# Patient Record
Sex: FEMALE | Race: WHITE | Employment: FULL TIME | URBAN - METROPOLITAN AREA
[De-identification: names, ages, dates, MRNs, and addresses within clinical notes are randomized per-mention and may not be internally consistent; named-entity substitution may affect disease eponyms.]

---

## 2018-11-30 ENCOUNTER — APPOINTMENT (OUTPATIENT)
Dept: CT IMAGING | Age: 48
End: 2018-11-30
Payer: COMMERCIAL

## 2018-11-30 ENCOUNTER — HOSPITAL ENCOUNTER (EMERGENCY)
Age: 48
Discharge: HOME | End: 2018-11-30
Attending: EMERGENCY MEDICINE
Payer: COMMERCIAL

## 2018-11-30 VITALS
BODY MASS INDEX: 31.89 KG/M2 | OXYGEN SATURATION: 96 % | SYSTOLIC BLOOD PRESSURE: 110 MMHG | HEART RATE: 89 BPM | TEMPERATURE: 99.7 F | DIASTOLIC BLOOD PRESSURE: 68 MMHG | RESPIRATION RATE: 16 BRPM | WEIGHT: 173.28 LBS | HEIGHT: 62 IN

## 2018-11-30 DIAGNOSIS — R10.9 ABDOMINAL PAIN, UNSPECIFIED ABDOMINAL LOCATION: Primary | ICD-10-CM

## 2018-11-30 DIAGNOSIS — N30.00 ACUTE CYSTITIS WITHOUT HEMATURIA: ICD-10-CM

## 2018-11-30 LAB
A/G RATIO: 1.1 (ref 1.1–2.2)
ALBUMIN SERPL-MCNC: 4 G/DL (ref 3.4–5)
ALP BLD-CCNC: 81 U/L (ref 40–129)
ALT SERPL-CCNC: 32 U/L (ref 10–40)
ANION GAP SERPL CALCULATED.3IONS-SCNC: 15 MMOL/L (ref 3–16)
AST SERPL-CCNC: 24 U/L (ref 15–37)
BACTERIA: ABNORMAL /HPF
BILIRUB SERPL-MCNC: 0.6 MG/DL (ref 0–1)
BILIRUBIN URINE: NEGATIVE
BLOOD, URINE: ABNORMAL
BUN BLDV-MCNC: 11 MG/DL (ref 7–20)
CALCIUM SERPL-MCNC: 8.9 MG/DL (ref 8.3–10.6)
CHLORIDE BLD-SCNC: 96 MMOL/L (ref 99–110)
CLARITY: ABNORMAL
CO2: 23 MMOL/L (ref 21–32)
COLOR: YELLOW
CREAT SERPL-MCNC: 1 MG/DL (ref 0.6–1.1)
EPITHELIAL CELLS, UA: 14 /HPF (ref 0–5)
GFR AFRICAN AMERICAN: >60
GFR NON-AFRICAN AMERICAN: 59
GLOBULIN: 3.7 G/DL
GLUCOSE BLD-MCNC: 115 MG/DL (ref 70–99)
GLUCOSE URINE: NEGATIVE MG/DL
HCG(URINE) PREGNANCY TEST: NEGATIVE
HCT VFR BLD CALC: 39.6 % (ref 36–48)
HEMOGLOBIN: 13.6 G/DL (ref 12–16)
KETONES, URINE: NEGATIVE MG/DL
LACTIC ACID: 1.1 MMOL/L (ref 0.4–2)
LEUKOCYTE ESTERASE, URINE: NEGATIVE
MCH RBC QN AUTO: 30.9 PG (ref 26–34)
MCHC RBC AUTO-ENTMCNC: 34.5 G/DL (ref 31–36)
MCV RBC AUTO: 89.6 FL (ref 80–100)
MICROSCOPIC EXAMINATION: YES
NITRITE, URINE: NEGATIVE
PDW BLD-RTO: 13.2 % (ref 12.4–15.4)
PH UA: 6
PLATELET # BLD: 240 K/UL (ref 135–450)
PMV BLD AUTO: 8.2 FL (ref 5–10.5)
POTASSIUM SERPL-SCNC: 3.2 MMOL/L (ref 3.5–5.1)
PROTEIN UA: NEGATIVE MG/DL
RBC # BLD: 4.42 M/UL (ref 4–5.2)
RBC UA: 13 /HPF (ref 0–4)
SODIUM BLD-SCNC: 134 MMOL/L (ref 136–145)
SPECIFIC GRAVITY UA: 1.02
TOTAL PROTEIN: 7.7 G/DL (ref 6.4–8.2)
URINE REFLEX TO CULTURE: YES
URINE TYPE: ABNORMAL
UROBILINOGEN, URINE: 0.2 E.U./DL
WBC # BLD: 10.2 K/UL (ref 4–11)
WBC UA: 16 /HPF (ref 0–5)

## 2018-11-30 PROCEDURE — 6370000000 HC RX 637 (ALT 250 FOR IP): Performed by: PHYSICIAN ASSISTANT

## 2018-11-30 PROCEDURE — 87040 BLOOD CULTURE FOR BACTERIA: CPT

## 2018-11-30 PROCEDURE — 83605 ASSAY OF LACTIC ACID: CPT

## 2018-11-30 PROCEDURE — 81001 URINALYSIS AUTO W/SCOPE: CPT

## 2018-11-30 PROCEDURE — 96361 HYDRATE IV INFUSION ADD-ON: CPT

## 2018-11-30 PROCEDURE — 99284 EMERGENCY DEPT VISIT MOD MDM: CPT

## 2018-11-30 PROCEDURE — 74176 CT ABD & PELVIS W/O CONTRAST: CPT

## 2018-11-30 PROCEDURE — 84703 CHORIONIC GONADOTROPIN ASSAY: CPT

## 2018-11-30 PROCEDURE — 96360 HYDRATION IV INFUSION INIT: CPT

## 2018-11-30 PROCEDURE — 6370000000 HC RX 637 (ALT 250 FOR IP): Performed by: EMERGENCY MEDICINE

## 2018-11-30 PROCEDURE — 85027 COMPLETE CBC AUTOMATED: CPT

## 2018-11-30 PROCEDURE — 87086 URINE CULTURE/COLONY COUNT: CPT

## 2018-11-30 PROCEDURE — 80053 COMPREHEN METABOLIC PANEL: CPT

## 2018-11-30 PROCEDURE — 2580000003 HC RX 258: Performed by: EMERGENCY MEDICINE

## 2018-11-30 RX ORDER — ONDANSETRON 4 MG/1
4 TABLET, ORALLY DISINTEGRATING ORAL EVERY 8 HOURS PRN
Qty: 20 TABLET | Refills: 0 | Status: SHIPPED | OUTPATIENT
Start: 2018-11-30

## 2018-11-30 RX ORDER — ACETAMINOPHEN 325 MG/1
650 TABLET ORAL ONCE
Status: COMPLETED | OUTPATIENT
Start: 2018-11-30 | End: 2018-11-30

## 2018-11-30 RX ORDER — DICYCLOMINE HYDROCHLORIDE 10 MG/1
10 CAPSULE ORAL EVERY 6 HOURS PRN
Qty: 20 CAPSULE | Refills: 0 | Status: SHIPPED | OUTPATIENT
Start: 2018-11-30 | End: 2018-11-30

## 2018-11-30 RX ORDER — ONDANSETRON 4 MG/1
4 TABLET, ORALLY DISINTEGRATING ORAL EVERY 8 HOURS PRN
Qty: 20 TABLET | Refills: 0 | Status: SHIPPED | OUTPATIENT
Start: 2018-11-30 | End: 2018-11-30

## 2018-11-30 RX ORDER — DICYCLOMINE HYDROCHLORIDE 10 MG/1
10 CAPSULE ORAL EVERY 6 HOURS PRN
Qty: 20 CAPSULE | Refills: 0 | Status: SHIPPED | OUTPATIENT
Start: 2018-11-30

## 2018-11-30 RX ORDER — 0.9 % SODIUM CHLORIDE 0.9 %
1000 INTRAVENOUS SOLUTION INTRAVENOUS ONCE
Status: COMPLETED | OUTPATIENT
Start: 2018-11-30 | End: 2018-11-30

## 2018-11-30 RX ORDER — POTASSIUM CHLORIDE 750 MG/1
20 TABLET, FILM COATED, EXTENDED RELEASE ORAL ONCE
Status: COMPLETED | OUTPATIENT
Start: 2018-11-30 | End: 2018-11-30

## 2018-11-30 RX ORDER — IBUPROFEN 600 MG/1
600 TABLET ORAL EVERY 6 HOURS PRN
Qty: 30 TABLET | Refills: 0 | Status: SHIPPED | OUTPATIENT
Start: 2018-11-30

## 2018-11-30 RX ORDER — MORPHINE SULFATE 4 MG/ML
4 INJECTION, SOLUTION INTRAMUSCULAR; INTRAVENOUS ONCE
Status: DISCONTINUED | OUTPATIENT
Start: 2018-11-30 | End: 2018-11-30

## 2018-11-30 RX ADMIN — ACETAMINOPHEN 650 MG: 325 TABLET, FILM COATED ORAL at 15:52

## 2018-11-30 RX ADMIN — SODIUM CHLORIDE 1000 ML: 9 INJECTION, SOLUTION INTRAVENOUS at 15:53

## 2018-11-30 RX ADMIN — POTASSIUM CHLORIDE 20 MEQ: 750 TABLET, EXTENDED RELEASE ORAL at 17:36

## 2018-11-30 ASSESSMENT — PAIN SCALES - GENERAL
PAINLEVEL_OUTOF10: 6
PAINLEVEL_OUTOF10: 4
PAINLEVEL_OUTOF10: 6

## 2018-11-30 ASSESSMENT — ENCOUNTER SYMPTOMS
SHORTNESS OF BREATH: 0
COLOR CHANGE: 0
CHEST TIGHTNESS: 0
DIARRHEA: 0
COUGH: 0
VOMITING: 0
ABDOMINAL PAIN: 0
NAUSEA: 1

## 2018-11-30 ASSESSMENT — PAIN DESCRIPTION - PAIN TYPE: TYPE: ACUTE PAIN

## 2018-11-30 ASSESSMENT — PAIN DESCRIPTION - DESCRIPTORS: DESCRIPTORS: RADIATING;STABBING

## 2018-11-30 ASSESSMENT — PAIN DESCRIPTION - LOCATION: LOCATION: FLANK;ABDOMEN

## 2018-11-30 ASSESSMENT — PAIN DESCRIPTION - ORIENTATION: ORIENTATION: RIGHT;LEFT;LOWER;MID

## 2018-11-30 NOTE — ED PROVIDER NOTES
an unremarkable noncontrast appearance. No urolithiasis or hydronephrosis GI/Bowel: No gastrointestinal abnormality demonstrated. Appendix normal Pelvis: Uterus surgically absent. Ovaries within normal limits with a dominant follicle on the left ovary. Urinary bladder unremarkable. No pelvic fluid Peritoneum/Retroperitoneum: No ascites or pneumoperitoneum. Aorta normal in caliber. No retroperitoneal adenopathy Bones/Soft Tissues: Small fat containing supraumbilical midline incisional hernia. No bony abnormality     1. No acute process 2. Hepatic steatosis         PROCEDURES   Unless otherwise noted below, none     Procedures    CRITICAL CARE TIME   N/A    CONSULTS:  None      EMERGENCY DEPARTMENT COURSE and DIFFERENTIALDIAGNOSIS/MDM:   Vitals:    Vitals:    11/30/18 1732 11/30/18 1747 11/30/18 1801 11/30/18 1902   BP: 106/69 105/70 108/69 110/68   Pulse:    89   Resp:    16   Temp:       TempSrc:       SpO2: 97% 97% 98% 96%   Weight:       Height:           Patient was given thefollowing medications:  Medications   0.9 % sodium chloride bolus (0 mLs Intravenous Stopped 11/30/18 1817)   acetaminophen (TYLENOL) tablet 650 mg (650 mg Oral Given 11/30/18 1552)   potassium chloride (KLOR-CON) extended release tablet 20 mEq (20 mEq Oral Given 11/30/18 1736)       Patient presents to ED with HPI noted above. Upon arrival she is tachycardic with a pulse of 111 but normotensive. Temperature 99.7 in the ED, given Tylenol. She is nontoxic in appearance. Oxygen saturation 96% on room air. Oxygen saturation did drop at one point to 93% on room air, she denies any chest pain or shortness of breath. Physical exam as above. Abdomen soft and nontender throughout. No tenderness to back. Septic workup initiated. Blood cultures pending. CBC showed no leukocytosis or anemia. CMP showed GFR 59, similar to previous. Creatinine 1.0. Patient does have hypokalemia, given Klor-Con in the ED.   Lactic acid within normal

## 2018-12-02 LAB — URINE CULTURE, ROUTINE: NORMAL

## 2018-12-05 LAB — CULTURE, BLOOD 2: NORMAL

## 2019-06-03 ENCOUNTER — HOSPITAL ENCOUNTER (EMERGENCY)
Age: 49
Discharge: HOME | End: 2019-06-03
Payer: COMMERCIAL

## 2019-06-03 ENCOUNTER — APPOINTMENT (OUTPATIENT)
Dept: CT IMAGING | Age: 49
End: 2019-06-03
Payer: COMMERCIAL

## 2019-06-03 VITALS
SYSTOLIC BLOOD PRESSURE: 107 MMHG | OXYGEN SATURATION: 99 % | BODY MASS INDEX: 31.69 KG/M2 | WEIGHT: 173.28 LBS | DIASTOLIC BLOOD PRESSURE: 71 MMHG | HEART RATE: 60 BPM | RESPIRATION RATE: 16 BRPM | TEMPERATURE: 98.8 F

## 2019-06-03 DIAGNOSIS — R51.9 ACUTE NONINTRACTABLE HEADACHE, UNSPECIFIED HEADACHE TYPE: Primary | ICD-10-CM

## 2019-06-03 LAB
A/G RATIO: 1.2 (ref 1.1–2.2)
ALBUMIN SERPL-MCNC: 4.6 G/DL (ref 3.4–5)
ALP BLD-CCNC: 104 U/L (ref 40–129)
ALT SERPL-CCNC: 20 U/L (ref 10–40)
ANION GAP SERPL CALCULATED.3IONS-SCNC: 14 MMOL/L (ref 3–16)
AST SERPL-CCNC: 17 U/L (ref 15–37)
BASOPHILS ABSOLUTE: 0 K/UL (ref 0–0.2)
BASOPHILS RELATIVE PERCENT: 0.4 %
BILIRUB SERPL-MCNC: 0.3 MG/DL (ref 0–1)
BUN BLDV-MCNC: 14 MG/DL (ref 7–20)
CALCIUM SERPL-MCNC: 9.7 MG/DL (ref 8.3–10.6)
CHLORIDE BLD-SCNC: 97 MMOL/L (ref 99–110)
CO2: 28 MMOL/L (ref 21–32)
CREAT SERPL-MCNC: 0.9 MG/DL (ref 0.6–1.1)
EOSINOPHILS ABSOLUTE: 0.1 K/UL (ref 0–0.6)
EOSINOPHILS RELATIVE PERCENT: 1.3 %
GFR AFRICAN AMERICAN: >60
GFR NON-AFRICAN AMERICAN: >60
GLOBULIN: 3.9 G/DL
GLUCOSE BLD-MCNC: 107 MG/DL (ref 70–99)
HCG QUALITATIVE: NEGATIVE
HCT VFR BLD CALC: 43.1 % (ref 36–48)
HEMOGLOBIN: 15 G/DL (ref 12–16)
LYMPHOCYTES ABSOLUTE: 1.9 K/UL (ref 1–5.1)
LYMPHOCYTES RELATIVE PERCENT: 19 %
MCH RBC QN AUTO: 31.1 PG (ref 26–34)
MCHC RBC AUTO-ENTMCNC: 34.7 G/DL (ref 31–36)
MCV RBC AUTO: 89.5 FL (ref 80–100)
MONOCYTES ABSOLUTE: 0.7 K/UL (ref 0–1.3)
MONOCYTES RELATIVE PERCENT: 6.8 %
NEUTROPHILS ABSOLUTE: 7.4 K/UL (ref 1.7–7.7)
NEUTROPHILS RELATIVE PERCENT: 72.5 %
PDW BLD-RTO: 13.5 % (ref 12.4–15.4)
PLATELET # BLD: 349 K/UL (ref 135–450)
PMV BLD AUTO: 7.8 FL (ref 5–10.5)
POTASSIUM SERPL-SCNC: 3.4 MMOL/L (ref 3.5–5.1)
RBC # BLD: 4.81 M/UL (ref 4–5.2)
SODIUM BLD-SCNC: 139 MMOL/L (ref 136–145)
TOTAL PROTEIN: 8.5 G/DL (ref 6.4–8.2)
WBC # BLD: 10.2 K/UL (ref 4–11)

## 2019-06-03 PROCEDURE — 2580000003 HC RX 258: Performed by: NURSE PRACTITIONER

## 2019-06-03 PROCEDURE — 99284 EMERGENCY DEPT VISIT MOD MDM: CPT

## 2019-06-03 PROCEDURE — 80053 COMPREHEN METABOLIC PANEL: CPT

## 2019-06-03 PROCEDURE — 6360000002 HC RX W HCPCS: Performed by: NURSE PRACTITIONER

## 2019-06-03 PROCEDURE — 85025 COMPLETE CBC W/AUTO DIFF WBC: CPT

## 2019-06-03 PROCEDURE — 70450 CT HEAD/BRAIN W/O DYE: CPT

## 2019-06-03 PROCEDURE — 96375 TX/PRO/DX INJ NEW DRUG ADDON: CPT

## 2019-06-03 PROCEDURE — 84703 CHORIONIC GONADOTROPIN ASSAY: CPT

## 2019-06-03 PROCEDURE — 6370000000 HC RX 637 (ALT 250 FOR IP): Performed by: NURSE PRACTITIONER

## 2019-06-03 PROCEDURE — 96365 THER/PROPH/DIAG IV INF INIT: CPT

## 2019-06-03 RX ORDER — 0.9 % SODIUM CHLORIDE 0.9 %
1000 INTRAVENOUS SOLUTION INTRAVENOUS ONCE
Status: COMPLETED | OUTPATIENT
Start: 2019-06-03 | End: 2019-06-03

## 2019-06-03 RX ORDER — DIPHENHYDRAMINE HCL 25 MG
25 TABLET ORAL ONCE
Status: COMPLETED | OUTPATIENT
Start: 2019-06-03 | End: 2019-06-03

## 2019-06-03 RX ORDER — PREDNISONE 20 MG/1
60 TABLET ORAL DAILY
Qty: 15 TABLET | Refills: 0 | Status: SHIPPED | OUTPATIENT
Start: 2019-06-03 | End: 2019-06-08

## 2019-06-03 RX ORDER — DEXAMETHASONE SODIUM PHOSPHATE 4 MG/ML
10 INJECTION, SOLUTION INTRA-ARTICULAR; INTRALESIONAL; INTRAMUSCULAR; INTRAVENOUS; SOFT TISSUE ONCE
Status: COMPLETED | OUTPATIENT
Start: 2019-06-03 | End: 2019-06-03

## 2019-06-03 RX ORDER — METOCLOPRAMIDE 10 MG/1
10 TABLET ORAL ONCE
Status: COMPLETED | OUTPATIENT
Start: 2019-06-03 | End: 2019-06-03

## 2019-06-03 RX ORDER — IBUPROFEN 400 MG/1
800 TABLET ORAL ONCE
Status: COMPLETED | OUTPATIENT
Start: 2019-06-03 | End: 2019-06-03

## 2019-06-03 RX ORDER — KETOROLAC TROMETHAMINE 30 MG/ML
15 INJECTION, SOLUTION INTRAMUSCULAR; INTRAVENOUS ONCE
Status: COMPLETED | OUTPATIENT
Start: 2019-06-03 | End: 2019-06-03

## 2019-06-03 RX ORDER — MAGNESIUM SULFATE 1 G/100ML
1 INJECTION INTRAVENOUS ONCE
Status: COMPLETED | OUTPATIENT
Start: 2019-06-03 | End: 2019-06-03

## 2019-06-03 RX ORDER — BUTALBITAL, ACETAMINOPHEN AND CAFFEINE 300; 40; 50 MG/1; MG/1; MG/1
1 CAPSULE ORAL EVERY 4 HOURS PRN
Qty: 20 CAPSULE | Refills: 0 | Status: SHIPPED | OUTPATIENT
Start: 2019-06-03

## 2019-06-03 RX ADMIN — MAGNESIUM SULFATE HEPTAHYDRATE 1 G: 1 INJECTION, SOLUTION INTRAVENOUS at 22:15

## 2019-06-03 RX ADMIN — IBUPROFEN 800 MG: 400 TABLET, FILM COATED ORAL at 20:21

## 2019-06-03 RX ADMIN — METOCLOPRAMIDE HYDROCHLORIDE 10 MG: 10 TABLET ORAL at 20:21

## 2019-06-03 RX ADMIN — DIPHENHYDRAMINE HCL 25 MG: 25 TABLET ORAL at 20:21

## 2019-06-03 RX ADMIN — KETOROLAC TROMETHAMINE 15 MG: 30 INJECTION, SOLUTION INTRAMUSCULAR at 23:00

## 2019-06-03 RX ADMIN — SODIUM CHLORIDE 1000 ML: 9 INJECTION, SOLUTION INTRAVENOUS at 22:15

## 2019-06-03 RX ADMIN — DEXAMETHASONE SODIUM PHOSPHATE 10 MG: 4 INJECTION, SOLUTION INTRAMUSCULAR; INTRAVENOUS at 22:15

## 2019-06-03 ASSESSMENT — PAIN DESCRIPTION - PAIN TYPE
TYPE: ACUTE PAIN
TYPE: ACUTE PAIN

## 2019-06-03 ASSESSMENT — PAIN DESCRIPTION - LOCATION
LOCATION: HEAD
LOCATION: HEAD

## 2019-06-03 ASSESSMENT — PAIN DESCRIPTION - FREQUENCY
FREQUENCY: INTERMITTENT
FREQUENCY: CONTINUOUS

## 2019-06-03 ASSESSMENT — PAIN SCALES - GENERAL
PAINLEVEL_OUTOF10: 6
PAINLEVEL_OUTOF10: 2
PAINLEVEL_OUTOF10: 7
PAINLEVEL_OUTOF10: 5
PAINLEVEL_OUTOF10: 7
PAINLEVEL_OUTOF10: 0

## 2019-06-03 ASSESSMENT — PAIN SCALES - WONG BAKER
WONGBAKER_NUMERICALRESPONSE: 0
WONGBAKER_NUMERICALRESPONSE: 6
WONGBAKER_NUMERICALRESPONSE: 6

## 2019-06-03 ASSESSMENT — PAIN DESCRIPTION - PROGRESSION: CLINICAL_PROGRESSION: NOT CHANGED

## 2019-06-03 ASSESSMENT — PAIN DESCRIPTION - DESCRIPTORS
DESCRIPTORS: ACHING;CONSTANT
DESCRIPTORS: ACHING;THROBBING

## 2019-06-04 ASSESSMENT — ENCOUNTER SYMPTOMS
COLOR CHANGE: 0
SHORTNESS OF BREATH: 0
COUGH: 0
ABDOMINAL PAIN: 0
NAUSEA: 0
BACK PAIN: 0
VOMITING: 0

## 2019-06-04 NOTE — ED PROVIDER NOTES
**EVALUATED BY ADVANCED PRACTICE PROVIDER**        [address]      Pt Name: Tinnie Hammans  KBU:[id number]  [place] 1970  Date of evaluation: 6/3/2019  Provider: LASHELL Ayala CNP      Chief Complaint:    Chief Complaint   Patient presents with    Headache     intermittent x3 weeks. MRI brain 5/15/19    Dizziness       Nursing Notes, Past Medical Hx, Past Surgical Hx, Social Hx, Allergies, and Family Hx were all reviewed and agreed with or any disagreements were addressed in the HPI.    HPI:  (Location, Duration, Timing, Severity,Quality, Assoc Sx, Context, Modifying factors)  This is a  51 y.o. female who presents to the emergency department today with parents complaining of headache associated with intermittent lightheadedness. Patient states she has had this headache intermittently for the last 3-4 weeks. It is associated with pain on the right posterior neck. She denies any injury or trauma. She rates the pain 6/10 and describes it as an aching throbbing pain. No exacerbating or alleviating factors. She's been taking Motrin which is not helping. She states she has seen her PCP as well as orthopedic at [facility].   She states she had an MRI last month of her brain which was normal.    PastMedical/Surgical History:      Diagnosis Date    Anxiety     Hypertension     Insomnia     Miscarriage     x2         Procedure Laterality Date    ECTOPIC PREGNANCY SURGERY      HYSTERECTOMY      HYSTERECTOMY         Medications:  Discharge Medication List as of 6/3/2019 11:36 PM      CONTINUE these medications which have NOT CHANGED    Details   dicyclomine (BENTYL) 10 MG capsule Take 1 capsule by mouth every 6 hours as needed (cramps), Disp-20 capsule, R-0Print      ondansetron (ZOFRAN ODT) 4 MG disintegrating tablet Take 1 tablet by mouth every 8 hours as needed for Nausea, Disp-20 tablet, R-0Print      ibuprofen (ADVIL;MOTRIN) 600 MG tablet Take 1 tablet by mouth every 6 hours as needed for Pain, Disp-30 tablet, R-0Print      vilazodone HCl (VILAZODONE HCL) 10 MG TABS Take 10 mg by mouth dailyHistorical Med      amitriptyline (ELAVIL) 10 MG tablet Take 10 mg by mouth nightly as needed, R-5      atorvastatin (LIPITOR) 10 MG tablet Take 10 mg by mouth Daily, R-5      azithromycin (ZITHROMAX) 250 MG tablet Take 250 mg by mouth      busPIRone (BUSPAR) 7.5 MG tablet Take 1.5 tablets by mouth daily, R-0      fluticasone (FLONASE) 50 MCG/ACT nasal spray 1 spray by Nasal route Daily, R-0      hydrochlorothiazide (HYDRODIURIL) 25 MG tablet Take 25 mg by mouth daily, R-4      LORazepam (ATIVAN) 0.5 MG tablet Take 0.5 mg by mouth daily as needed               Review of Systems:  Review of Systems   Constitutional: Negative for chills, diaphoresis and fever. HENT: Negative. Eyes: Negative for visual disturbance. Respiratory: Negative for cough and shortness of breath. Cardiovascular: Negative for chest pain. Gastrointestinal: Negative for abdominal pain, nausea and vomiting. Musculoskeletal: Positive for neck pain. Negative for back pain. Skin: Negative for color change and rash. Allergic/Immunologic: Negative for immunocompromised state. Neurological: Positive for light-headedness and headaches. Negative for dizziness, syncope, weakness and numbness. Hematological: Negative for adenopathy. Psychiatric/Behavioral: Negative for confusion. All other systems reviewed and are negative. Positives and Pertinent negatives as per HPI. Except as noted above in the ROS, problem specific ROS was completed and is negative. Physical Exam:  Physical Exam   Constitutional: She is oriented to person, place, and time. Vital signs are normal. She appears well-developed and well-nourished. Non-toxic appearance. No distress. HENT:   Head: Normocephalic and atraumatic.    Right Ear: Tympanic membrane and ear canal normal. Left Ear: Tympanic membrane and ear canal normal.   Eyes: Pupils are equal, round, and reactive to light. Conjunctivae and EOM are normal. No scleral icterus. Neck: Normal range of motion. Neck supple. No JVD present. Muscular tenderness present. No spinous process tenderness present. No neck rigidity. Cardiovascular: Normal rate and regular rhythm. Exam reveals no gallop and no friction rub. No murmur heard. Pulmonary/Chest: Effort normal and breath sounds normal. No respiratory distress. Abdominal: Soft. Normal appearance. She exhibits no distension. There is no tenderness. There is no rigidity. Musculoskeletal: Normal range of motion. Neurological: She is alert and oriented to person, place, and time. She has normal strength. No cranial nerve deficit or sensory deficit. Reflex Scores:       Brachioradialis reflexes are 2+ on the right side and 2+ on the left side. Patellar reflexes are 2+ on the right side and 2+ on the left side. Skin: Skin is warm, dry and intact. Capillary refill takes less than 2 seconds. No rash noted. Psychiatric: She has a normal mood and affect. Nursing note and vitals reviewed.       MEDICAL DECISION MAKING    Vitals:    Vitals:    06/03/19 1952 06/03/19 2348 06/03/19 2349   BP: (!) 155/110 107/71    Pulse: 98 60    Resp: 16 16    Temp: 98.8 °F (37.1 °C)     TempSrc: Oral     SpO2: 99%     Weight:   173 lb 4.5 oz (78.6 kg)       LABS:  Labs Reviewed   COMPREHENSIVE METABOLIC PANEL - Abnormal; Notable for the following components:       Result Value    Potassium 3.4 (*)     Chloride 97 (*)     Glucose 107 (*)     Total Protein 8.5 (*)     All other components within normal limits    Narrative:     Performed at:  [address] [address]   Phone [phone]   CBC WITH AUTO DIFFERENTIAL    Narrative:     Performed at:  [facility]  [address] [facility] [place] [facility] [address]   Phone [phone]   HCG, SERUM, QUALITATIVE    Narrative:     Performed at:  [facility]  [address] [place] [address]   Phone ([phone] of labs reviewed and werenegative at this time or not returned at the time of this note. RADIOLOGY:   Non-plain film images such as CT, Ultrasound and MRI are read by the radiologist. LASHELL Millard CNP have directly visualized the radiologic plain film image(s) with the below findings:        Interpretation per the Radiologist below, if available at the time of thisnote:    [address]   Final Result   No acute intracranial abnormality. Ct Head Wo Contrast    Result Date: 6/3/2019  EXAMINATION: CT OF THE HEAD WITHOUT CONTRAST  6/3/2019 8:15 pm TECHNIQUE: CT of the head was performed without the administration of intravenous contrast. Dose modulation, iterative reconstruction, and/or weight based adjustment of the mA/kV was utilized to reduce the radiation dose to as low as reasonably achievable. COMPARISON: None. HISTORY: ORDERING SYSTEM PROVIDED HISTORY: pain TECHNOLOGIST PROVIDED HISTORY: Has a \"code stroke\" or \"stroke alert\" been called? ->No Ordering Physician Provided Reason for Exam: ha with dizziness x 4 wks FINDINGS: BRAIN/VENTRICLES: There is no acute intracranial hemorrhage, mass effect or midline shift. No abnormal extra-axial fluid collection. The gray-white differentiation is maintained without evidence of an acute infarct. There is no evidence of hydrocephalus. ORBITS: The visualized portion of the orbits demonstrate no acute abnormality. SINUSES: The visualized paranasal sinuses and mastoid air cells demonstrate no acute abnormality. SOFT TISSUES/SKULL:  No acute abnormality of the visualized skull or soft tissues. No acute intracranial abnormality.          MEDICAL DECISION MAKING / ED COURSE:      PROCEDURES: Procedures    None    Patient was given:  Medications   metoclopramide (REGLAN) tablet 10 mg (10 mg Oral Given 6/3/19 2021)   diphenhydrAMINE (BENADRYL) tablet 25 mg (25 mg Oral Given 6/3/19 2021)   ibuprofen (ADVIL;MOTRIN) tablet 800 mg (800 mg Oral Given 6/3/19 2021)   Dexamethasone Sodium Phosphate injection 10 mg (10 mg Intravenous Given 6/3/19 2215)   magnesium sulfate 1 g in dextrose 5% 100 mL IVPB (0 g Intravenous Stopped 6/3/19 2336)   0.9 % sodium chloride bolus (0 mLs Intravenous Stopped 6/3/19 2336)   ketorolac (TORADOL) injection 15 mg (15 mg Intravenous Given 6/3/19 2300)       Differential Diagnosis: Subarachnoid hemorrhage, Meningitis, Temporal arteritis, Pseudotumor Cerebri, Migraine, other    Patient seen and examined today for headache. See HPI for patient presentation. Patient is hemodynamically stable, nontoxic, afebrile, and without tachycardia, tachypnea, and hypoxia. Physical exam as above. Well-appearing 41-year-old female lying in bed in no acute distress. She is neurovascularly intact without deficits. No leukocytosis or anemia. Electrolyte abnormality other kidney or liver dysfunction. Pregnancy is negative. CT head normal.    Emergency department course included Toradol, Decadron, magnesium, fluids, Reglan, Benadryl, and Motrin. On my reassessment patient was resting comfortably. She stated she felt much better. I advised patient that to rule out a subarachnoid hemorrhage we would need to do a spinal tap that she did not want to have that done. She did refuse this. I advised her that we cannot rule out a subarachnoid hemorrhage without doing this but she did not want to have this done. She's had this headache intermittently for the last several weeks and my clinical suspicion for the hemorrhage is very low. She is alert he had an MRI. She has an NIH of 0. She has no neurovascular deficits.   She will be given steroids because this is possibly a tension headache from a

## 2020-06-19 ENCOUNTER — HOSPITAL ENCOUNTER (EMERGENCY)
Age: 50
Discharge: HOME | End: 2020-06-19
Payer: COMMERCIAL

## 2020-06-19 VITALS
BODY MASS INDEX: 31.05 KG/M2 | WEIGHT: 175.27 LBS | SYSTOLIC BLOOD PRESSURE: 110 MMHG | OXYGEN SATURATION: 99 % | DIASTOLIC BLOOD PRESSURE: 74 MMHG | RESPIRATION RATE: 18 BRPM | HEART RATE: 81 BPM | TEMPERATURE: 98.6 F | HEIGHT: 63 IN

## 2020-06-19 PROCEDURE — 96375 TX/PRO/DX INJ NEW DRUG ADDON: CPT

## 2020-06-19 PROCEDURE — 6360000002 HC RX W HCPCS: Performed by: NURSE PRACTITIONER

## 2020-06-19 PROCEDURE — 99282 EMERGENCY DEPT VISIT SF MDM: CPT

## 2020-06-19 PROCEDURE — 2580000003 HC RX 258: Performed by: NURSE PRACTITIONER

## 2020-06-19 PROCEDURE — 96374 THER/PROPH/DIAG INJ IV PUSH: CPT

## 2020-06-19 RX ORDER — METOCLOPRAMIDE HYDROCHLORIDE 5 MG/ML
10 INJECTION INTRAMUSCULAR; INTRAVENOUS ONCE
Status: COMPLETED | OUTPATIENT
Start: 2020-06-19 | End: 2020-06-19

## 2020-06-19 RX ORDER — BUTALBITAL, ACETAMINOPHEN AND CAFFEINE 300; 40; 50 MG/1; MG/1; MG/1
1 CAPSULE ORAL 3 TIMES DAILY PRN
Qty: 20 CAPSULE | Refills: 0 | Status: SHIPPED | OUTPATIENT
Start: 2020-06-19

## 2020-06-19 RX ORDER — METHYLPREDNISOLONE 4 MG/1
TABLET ORAL
Qty: 1 KIT | Refills: 0 | Status: SHIPPED | OUTPATIENT
Start: 2020-06-19 | End: 2020-06-25

## 2020-06-19 RX ORDER — METOCLOPRAMIDE 10 MG/1
10 TABLET ORAL 3 TIMES DAILY PRN
Qty: 120 TABLET | Refills: 0 | Status: SHIPPED | OUTPATIENT
Start: 2020-06-19

## 2020-06-19 RX ORDER — ORPHENADRINE CITRATE 100 MG/1
100 TABLET, EXTENDED RELEASE ORAL 2 TIMES DAILY PRN
Qty: 20 TABLET | Refills: 0 | Status: SHIPPED | OUTPATIENT
Start: 2020-06-19 | End: 2020-06-29

## 2020-06-19 RX ORDER — DEXAMETHASONE SODIUM PHOSPHATE 10 MG/ML
10 INJECTION INTRAMUSCULAR; INTRAVENOUS ONCE
Status: COMPLETED | OUTPATIENT
Start: 2020-06-19 | End: 2020-06-19

## 2020-06-19 RX ORDER — KETOROLAC TROMETHAMINE 30 MG/ML
15 INJECTION, SOLUTION INTRAMUSCULAR; INTRAVENOUS ONCE
Status: COMPLETED | OUTPATIENT
Start: 2020-06-19 | End: 2020-06-19

## 2020-06-19 RX ORDER — DIPHENHYDRAMINE HYDROCHLORIDE 50 MG/ML
12.5 INJECTION INTRAMUSCULAR; INTRAVENOUS ONCE
Status: COMPLETED | OUTPATIENT
Start: 2020-06-19 | End: 2020-06-19

## 2020-06-19 RX ORDER — 0.9 % SODIUM CHLORIDE 0.9 %
1000 INTRAVENOUS SOLUTION INTRAVENOUS ONCE
Status: COMPLETED | OUTPATIENT
Start: 2020-06-19 | End: 2020-06-19

## 2020-06-19 RX ADMIN — METOCLOPRAMIDE HYDROCHLORIDE 10 MG: 5 INJECTION INTRAMUSCULAR; INTRAVENOUS at 20:14

## 2020-06-19 RX ADMIN — SODIUM CHLORIDE 1000 ML: 9 INJECTION, SOLUTION INTRAVENOUS at 20:13

## 2020-06-19 RX ADMIN — DIPHENHYDRAMINE HYDROCHLORIDE 12.5 MG: 50 INJECTION, SOLUTION INTRAMUSCULAR; INTRAVENOUS at 20:14

## 2020-06-19 RX ADMIN — DEXAMETHASONE SODIUM PHOSPHATE 10 MG: 10 INJECTION INTRAMUSCULAR; INTRAVENOUS at 20:14

## 2020-06-19 RX ADMIN — KETOROLAC TROMETHAMINE 15 MG: 30 INJECTION, SOLUTION INTRAMUSCULAR at 20:14

## 2020-06-19 ASSESSMENT — PAIN SCALES - GENERAL
PAINLEVEL_OUTOF10: 8
PAINLEVEL_OUTOF10: 8

## 2020-06-19 ASSESSMENT — PAIN DESCRIPTION - PAIN TYPE: TYPE: ACUTE PAIN

## 2020-06-19 ASSESSMENT — PAIN DESCRIPTION - LOCATION: LOCATION: HEAD;FACE

## 2020-06-19 ASSESSMENT — PAIN DESCRIPTION - PROGRESSION: CLINICAL_PROGRESSION: NOT CHANGED

## 2020-06-19 ASSESSMENT — ENCOUNTER SYMPTOMS
PHOTOPHOBIA: 1
RESPIRATORY NEGATIVE: 1
NAUSEA: 1

## 2020-06-19 ASSESSMENT — PAIN DESCRIPTION - FREQUENCY: FREQUENCY: CONTINUOUS

## 2020-06-19 ASSESSMENT — PAIN DESCRIPTION - ONSET: ONSET: ON-GOING

## 2020-06-19 NOTE — ED PROVIDER NOTES
made to ensure that this office note is accurate. CONSULTS:  None    PROCEDURES:  Procedures    FINAL IMPRESSION      1. Intractable migraine without aura and with status migrainosus          DISPOSITION/PLAN   [unfilled]    PATIENT REFERRED TO:  Rhonda Flood MD  [address] [place] [address]. [address]  [phone]      Keep appt for Tuesday    [facility] Emergency Department  [address] [facility]  [phone]    If symptoms worsen      DISCHARGE MEDICATIONS:  New Prescriptions    BUTALBITAL-APAP-CAFFEINE -40 MG CAPS PER CAPSULE    Take 1 capsule by mouth 3 times daily as needed for Headaches or Migraine    METHYLPREDNISOLONE (MEDROL, GINGER,) 4 MG TABLET    By mouth.     METOCLOPRAMIDE (REGLAN) 10 MG TABLET    Take 1 tablet by mouth 3 times daily as needed (Nausea associated with migraine headache)    ORPHENADRINE (NORFLEX) 100 MG EXTENDED RELEASE TABLET    Take 1 tablet by mouth 2 times daily as needed (muscle tension,)       (Please note that portions of this note were completed with a voice recognition program.  Efforts were made to edit the dictations but occasionally words are mis-transcribed.)    Ayan Jeter, APRN - CNP          Kamran Resendiz, APRN - CNP  06/19/20 [id number]

## 2020-08-01 ENCOUNTER — HOSPITAL ENCOUNTER (EMERGENCY)
Age: 50
Discharge: HOME | End: 2020-08-01
Attending: EMERGENCY MEDICINE
Payer: COMMERCIAL

## 2020-08-01 VITALS
SYSTOLIC BLOOD PRESSURE: 140 MMHG | OXYGEN SATURATION: 98 % | DIASTOLIC BLOOD PRESSURE: 77 MMHG | WEIGHT: 176.59 LBS | HEART RATE: 65 BPM | RESPIRATION RATE: 10 BRPM | TEMPERATURE: 98.2 F | BODY MASS INDEX: 31.28 KG/M2

## 2020-08-01 LAB
ANION GAP SERPL CALCULATED.3IONS-SCNC: 12 MMOL/L (ref 3–16)
BASOPHILS ABSOLUTE: 0.1 K/UL (ref 0–0.2)
BASOPHILS RELATIVE PERCENT: 0.8 %
BILIRUBIN URINE: NEGATIVE
BLOOD, URINE: NEGATIVE
BUN BLDV-MCNC: 15 MG/DL (ref 7–20)
CALCIUM SERPL-MCNC: 9 MG/DL (ref 8.3–10.6)
CHLORIDE BLD-SCNC: 99 MMOL/L (ref 99–110)
CLARITY: CLEAR
CO2: 27 MMOL/L (ref 21–32)
COLOR: YELLOW
CREAT SERPL-MCNC: 0.9 MG/DL (ref 0.6–1.1)
EOSINOPHILS ABSOLUTE: 0.2 K/UL (ref 0–0.6)
EOSINOPHILS RELATIVE PERCENT: 1.9 %
GFR AFRICAN AMERICAN: >60
GFR NON-AFRICAN AMERICAN: >60
GLUCOSE BLD-MCNC: 93 MG/DL (ref 70–99)
GLUCOSE URINE: NEGATIVE MG/DL
HCT VFR BLD CALC: 40.1 % (ref 36–48)
HEMOGLOBIN: 13.8 G/DL (ref 12–16)
KETONES, URINE: NEGATIVE MG/DL
LEUKOCYTE ESTERASE, URINE: NEGATIVE
LYMPHOCYTES ABSOLUTE: 1.9 K/UL (ref 1–5.1)
LYMPHOCYTES RELATIVE PERCENT: 19.1 %
MCH RBC QN AUTO: 30.6 PG (ref 26–34)
MCHC RBC AUTO-ENTMCNC: 34.3 G/DL (ref 31–36)
MCV RBC AUTO: 89.1 FL (ref 80–100)
MICROSCOPIC EXAMINATION: NORMAL
MONOCYTES ABSOLUTE: 0.9 K/UL (ref 0–1.3)
MONOCYTES RELATIVE PERCENT: 8.7 %
NEUTROPHILS ABSOLUTE: 6.9 K/UL (ref 1.7–7.7)
NEUTROPHILS RELATIVE PERCENT: 69.5 %
NITRITE, URINE: NEGATIVE
PDW BLD-RTO: 13.9 % (ref 12.4–15.4)
PH UA: 6 (ref 5–8)
PLATELET # BLD: 267 K/UL (ref 135–450)
PMV BLD AUTO: 8.7 FL (ref 5–10.5)
POTASSIUM REFLEX MAGNESIUM: 3.8 MMOL/L (ref 3.5–5.1)
PROTEIN UA: NEGATIVE MG/DL
RBC # BLD: 4.5 M/UL (ref 4–5.2)
SODIUM BLD-SCNC: 138 MMOL/L (ref 136–145)
SPECIFIC GRAVITY UA: 1.01 (ref 1–1.03)
URINE REFLEX TO CULTURE: NORMAL
URINE TYPE: NORMAL
UROBILINOGEN, URINE: 0.2 E.U./DL
WBC # BLD: 10 K/UL (ref 4–11)

## 2020-08-01 PROCEDURE — 99285 EMERGENCY DEPT VISIT HI MDM: CPT

## 2020-08-01 PROCEDURE — 36415 COLL VENOUS BLD VENIPUNCTURE: CPT

## 2020-08-01 PROCEDURE — 81003 URINALYSIS AUTO W/O SCOPE: CPT

## 2020-08-01 PROCEDURE — 80048 BASIC METABOLIC PNL TOTAL CA: CPT

## 2020-08-01 PROCEDURE — 85025 COMPLETE CBC W/AUTO DIFF WBC: CPT

## 2020-08-01 PROCEDURE — 93005 ELECTROCARDIOGRAM TRACING: CPT | Performed by: EMERGENCY MEDICINE

## 2020-08-01 ASSESSMENT — ENCOUNTER SYMPTOMS
COUGH: 1
BACK PAIN: 0
TROUBLE SWALLOWING: 0
CONSTIPATION: 0
SHORTNESS OF BREATH: 0
VOICE CHANGE: 0
DIARRHEA: 0
SORE THROAT: 0

## 2020-08-01 NOTE — ED PROVIDER NOTES
EKG Interpretation    Interpreted by emergency department physician  Time read: 3585    Rhythm: Sinus  Ventricular Rate: 93  QRS Axis: -61  Ectopy: None  Conduction: Normal sinus rhythm with left axis deviation due to left anterior fascicular block, also noted is a prolonged QT interval  ST Segments: normal  T Waves: normal  Q Waves: None    Other findings: Poor R wave progression noted to the anterior chest leads    Compared to EKG on: None to compare    Clinical Impression: Normal sinus rhythm with left axis deviation due to a left anterior fascicular block. There is also prolonged QT interval.  There is no old EKG to compare.     [facility] [place]  08/01/20 5615

## 2020-08-01 NOTE — ED PROVIDER NOTES
[address] [facility]      Pt Name: Abebe Rust  MRN: [id number]  [place] 1970  Date of evaluation: 8/1/2020  Provider: Vickie Salinas MD    CHIEF COMPLAINT       Chief Complaint   Patient presents with    Extremity Weakness     patient states that yesterday left leg and arm were tingling and numb last night before bed. pt. c/o sudden severe headache with left leg pain/numbness that started approx 30 minutes ago. HISTORY OF PRESENT ILLNESS   (Location/Symptom, Timing/Onset,Context/Setting, Quality, Duration, Modifying Factors, Severity)  Note limiting factors. Abebe Rust is a 45 y.o. female who presents to the emergency department secondary to concern for headache with left side weirdness. Reports it Claude Hitchcock been a long time coming\" and has been seeing a neurologist for a few months and has had treatment for migraines and neck pain. Was at [employer] when she got headache that felt different than her usual migraine. Pain was   Associated with the headache she felt dizzy and weak and had left side tingling. Usually migraines last 30 minutes and today her migraine was only a few minutes while at the store (not even two minutes). Reports the symptoms have all resolved and currently she has none. She came in because she was freaked out and her biggest worry she states is that she has \"something\" - has an appointment with neurologist on Thursday because they want to run more tests. Past medical history noted below, significant for anxiety, HTN, insomnia, miscarriage. Takes triptan for migraine as needed. States she has been sleeping well. Aside from what is stated above denies any other symptoms or modifying factors. Nursing Notes were reviewed. REVIEW OF SYSTEMS    (2-9 systems for level 4, 10 or more for level 5)   Review of Systems   Constitutional: Positive for activity change (less active overall ).  Negative for appetite change and unexpected weight change. HENT: Negative for congestion, sore throat, trouble swallowing and voice change. Eyes: Thinks she might need to start using readers, but in April had eye exam and was unremarkable   Respiratory: Positive for cough (2/2 allergies). Negative for shortness of breath. Gastrointestinal: Negative for constipation and diarrhea. Endocrine: Negative. Genitourinary: Negative for dysuria, flank pain and frequency. Musculoskeletal: Negative for back pain and gait problem. Neurological: Negative for syncope and facial asymmetry. Hematological: Negative.         PAST MEDICAL HISTORY     Past Medical History:   Diagnosis Date    Anxiety     Hypertension     Insomnia     Miscarriage     x2       SURGICALHISTORY       Past Surgical History:   Procedure Laterality Date    ECTOPIC PREGNANCY SURGERY      HYSTERECTOMY      HYSTERECTOMY       CURRENT MEDICATIONS       Previous Medications    AMITRIPTYLINE (ELAVIL) 10 MG TABLET    Take 10 mg by mouth nightly as needed    ATORVASTATIN (LIPITOR) 10 MG TABLET    Take 10 mg by mouth Daily    AZITHROMYCIN (ZITHROMAX) 250 MG TABLET    Take 250 mg by mouth    BUSPIRONE (BUSPAR) 7.5 MG TABLET    Take 1.5 tablets by mouth daily    BUTALBITAL-APAP-CAFFEINE (FIORICET) -40 MG CAPS PER CAPSULE    Take 1 capsule by mouth every 4 hours as needed for Headaches    BUTALBITAL-APAP-CAFFEINE -40 MG CAPS PER CAPSULE    Take 1 capsule by mouth 3 times daily as needed for Headaches or Migraine    DICYCLOMINE (BENTYL) 10 MG CAPSULE    Take 1 capsule by mouth every 6 hours as needed (cramps)    FLUTICASONE (FLONASE) 50 MCG/ACT NASAL SPRAY    1 spray by Nasal route Daily    HYDROCHLOROTHIAZIDE (HYDRODIURIL) 25 MG TABLET    Take 25 mg by mouth daily    IBUPROFEN (ADVIL;MOTRIN) 600 MG TABLET    Take 1 tablet by mouth every 6 hours as needed for Pain    LORAZEPAM (ATIVAN) 0.5 MG TABLET    Take 0.5 mg by mouth daily as needed METOCLOPRAMIDE (REGLAN) 10 MG TABLET    Take 1 tablet by mouth 3 times daily as needed (Nausea associated with migraine headache)    ONDANSETRON (ZOFRAN ODT) 4 MG DISINTEGRATING TABLET    Take 1 tablet by mouth every 8 hours as needed for Nausea    VILAZODONE HCL (VILAZODONE HCL) 10 MG TABS    Take 10 mg by mouth daily      ALLERGIES     is allergic to wellbutrin [bupropion]. FAMILY HISTORY     History reviewed. No pertinent family history. SOCIAL HISTORY       Social History     Socioeconomic History    Marital status:      Spouse name: None    Number of children: None    Years of education: None    Highest education level: None   Occupational History    None   Social Needs    Financial resource strain: None    Food insecurity     Worry: None     Inability: None    Transportation needs     Medical: None     Non-medical: None   Tobacco Use    Smoking status: Current Some Day Smoker    Smokeless tobacco: Never Used    Tobacco comment: every few months    Substance and Sexual Activity    Alcohol use: Yes     Comment: soc    Drug use: No    Sexual activity: None   Lifestyle    Physical activity     Days per week: None     Minutes per session: None    Stress: None   Relationships    Social connections     Talks on phone: None     Gets together: None     Attends Buddhist service: None     Active member of club or organization: None     Attends meetings of clubs or organizations: None     Relationship status: None    Intimate partner violence     Fear of current or ex partner: None     Emotionally abused: None     Physically abused: None     Forced sexual activity: None   Other Topics Concern    None   Social History Narrative    None     SCREENINGS   NIH Stroke Scale  Interval: Baseline  Level of Consciousness (1a. ): Alert  LOC Questions (1b. ):  Answers both correctly  LOC Commands (1c. ): Performs both tasks correctly  Best Gaze (2. ): Normal  Visual (3. ): No visual loss  Facial Palsy left CVA tenderness or guarding. Musculoskeletal:         General: No swelling. Right lower leg: No edema. Left lower leg: No edema. Skin:     General: Skin is warm and dry. Capillary Refill: Capillary refill takes less than 2 seconds. Coloration: Skin is not jaundiced or pale. Findings: No rash. Neurological:      Mental Status: She is alert and oriented to person, place, and time. Cranial Nerves: No cranial nerve deficit, dysarthria or facial asymmetry. Sensory: No sensory deficit. Motor: No weakness, tremor or pronator drift. Coordination: Coordination normal. Finger-Nose-Finger Test normal. Rapid alternating movements normal.      Gait: Gait is intact. Psychiatric:         Attention and Perception: Attention normal.         Mood and Affect: Mood normal.         Speech: Speech normal.         Behavior: Behavior normal.       DIAGNOSTIC RESULTS     LABS:  Labs Reviewed   CBC WITH AUTO DIFFERENTIAL    Narrative:     Performed at:  [address] [address]   Phone [phone]   BASIC METABOLIC PANEL W/ REFLEX TO MG FOR LOW K    Narrative:     Performed at:  [address] [address]   Phone [phone]   URINE RT REFLEX TO CULTURE    Narrative:     Performed at:  [address] [address]   Phone [phone]       All other labs were within normal range or not returned as of this dictation. EMERGENCY DEPARTMENT COURSE and DIFFERENTIAL DIAGNOSIS/MDM:   Patient was given the following medications:  No orders of the defined types were placed in this encounter.       CONSULTS:  None    RECENT VITALS:  BP: (!) 140/77,Temp: 98.2 °F (36.8 °C), Pulse: 65, Resp: 10, SpO2: 98 %     Catalina Salazar is a 45 y.o. female who presents to the emergency department secondary to concern for brief headache with left-sided tingling that occurred prior to arrival.    On arrival she is awake, alert, oriented with vitals that are notable for blood pressure 164/96 otherwise hemodynamically stable and within normal limits. On exam she is well-appearing, pleasant, and in no acute distress. She is alert and oriented x4. She answers questions appropriately is conversant and has a normal mood and affect. Her pupils are equal, round, and reactive to light. Her extraocular movements are intact. Visual fields are grossly full. Facial expression and sensation are symmetric. Hearing is symmetric. Her tongue and uvula are midline. Her palate rises symmetrically. Her shoulder shrug and head rotation are strong and symmetric. Her strength is 5 out of 5 in all 4 extremities. There is no pronator drift noted. She is able to walk with a well balanced and well correlated gait. Her Romberg sign is negative. Her sensation is intact to light touch x4 though subjectively she states that the left side feels different than the right. Her finger-nose testing is symmetric without signs of dysmetria. NIHSS is 0. She does not currently have a headache and has no history of trauma. My concern for intracranial pathology is low. We discussed recess benefit of CT imaging given her symptoms and agreed radiation risk higher than benefit. She has no symptoms to suggest a cardiac pathology. She has been following up with her primary care physician as well as a neurologist and has follow-up with them later this week. She was recently put on potassium supplements due to low potassium and unfortunately care everywhere was not updating with most recent labs. She was amenable to having a CBC and renal panel done here to evaluate for new anemia and reevaluate her electrolytes. These were all within normal limits.   Urinalysis was also ordered and no signs of ketone, no dehydration, no signs of overt infection. Discussed with patient other potential causes of her symptoms including atypical migraine, stress/anxiety, and hormones. We discussed her talking with her mother to ask what symptoms she had during her menopause years. Also discussed asking about potential for dry needling for help with symptoms of neck muscle pain. Prior to discharge we discussed at length strict return precautions as well as importance of keeping her appointment scheduled with her primary care and her neurologist which she verbalized understanding of. FINAL IMPRESSION      1.  Tingling        DISPOSITION/PLAN   DISPOSITION        PATIENT REFERRED TO:  Lacey Bates MD  [address] [facility]  [phone]    Schedule an appointment as soon as possible for a visit   For follow up appointment    Faviola Gary MD  [address] [facility], [address] [place] [address]  [phone]    Schedule an appointment as soon as possible for a visit   For follow up appointment      DISCHARGE MEDICATIONS:  New Prescriptions    No medications on file            (Please note that portions of this note were completed with a voice recognition program. Efforts were made to edit the dictations but occasionally words are mis-transcribed.)    Sapphire Lenz MD (electronically signed)  Attending Emergency Physician          Sapphire Lenz MD  08/01/20 0602

## 2020-08-03 LAB
EKG ATRIAL RATE: 93 BPM
EKG DIAGNOSIS: NORMAL
EKG P AXIS: 58 DEGREES
EKG P-R INTERVAL: 204 MS
EKG Q-T INTERVAL: 384 MS
EKG QRS DURATION: 78 MS
EKG QTC CALCULATION (BAZETT): 477 MS
EKG R AXIS: -61 DEGREES
EKG T AXIS: 74 DEGREES
EKG VENTRICULAR RATE: 93 BPM

## 2020-08-03 PROCEDURE — 93010 ELECTROCARDIOGRAM REPORT: CPT | Performed by: INTERNAL MEDICINE

## 2021-03-15 PROCEDURE — 91300 COVID-19, PFIZER VACCINE 30MCG/0.3ML DOSE: CPT | Performed by: NURSE PRACTITIONER

## 2021-03-15 PROCEDURE — 0001A COVID-19, PFIZER VACCINE 30MCG/0.3ML DOSE: CPT | Performed by: NURSE PRACTITIONER

## 2021-03-17 ENCOUNTER — NURSE ONLY (OUTPATIENT)
Dept: PRIMARY CARE CLINIC | Age: 51
End: 2021-03-17
Payer: COMMERCIAL

## 2021-03-17 DIAGNOSIS — Z23 HIGH PRIORITY FOR COVID-19 VIRUS VACCINATION: Primary | ICD-10-CM

## 2021-04-12 ENCOUNTER — NURSE ONLY (OUTPATIENT)
Dept: PRIMARY CARE CLINIC | Age: 51
End: 2021-04-12
Payer: COMMERCIAL

## 2021-04-12 DIAGNOSIS — Z23 HIGH PRIORITY FOR COVID-19 VIRUS VACCINATION: Primary | ICD-10-CM

## 2021-04-12 PROCEDURE — 91300 COVID-19, PFIZER VACCINE 30MCG/0.3ML DOSE: CPT | Performed by: NURSE PRACTITIONER

## 2021-04-12 PROCEDURE — 0002A COVID-19, PFIZER VACCINE 30MCG/0.3ML DOSE: CPT | Performed by: NURSE PRACTITIONER

## 2021-10-20 ENCOUNTER — HOSPITAL ENCOUNTER (EMERGENCY)
Age: 51
Discharge: HOME | End: 2021-10-20
Attending: EMERGENCY MEDICINE
Payer: COMMERCIAL

## 2021-10-20 VITALS
RESPIRATION RATE: 17 BRPM | OXYGEN SATURATION: 97 % | DIASTOLIC BLOOD PRESSURE: 80 MMHG | TEMPERATURE: 97.8 F | HEIGHT: 63 IN | BODY MASS INDEX: 31.68 KG/M2 | SYSTOLIC BLOOD PRESSURE: 126 MMHG | HEART RATE: 87 BPM | WEIGHT: 178.79 LBS

## 2021-10-20 DIAGNOSIS — I10 PRIMARY HYPERTENSION: Primary | ICD-10-CM

## 2021-10-20 PROCEDURE — 93005 ELECTROCARDIOGRAM TRACING: CPT | Performed by: EMERGENCY MEDICINE

## 2021-10-20 PROCEDURE — 99282 EMERGENCY DEPT VISIT SF MDM: CPT

## 2021-10-21 LAB
EKG ATRIAL RATE: 74 BPM
EKG DIAGNOSIS: NORMAL
EKG P AXIS: 53 DEGREES
EKG P-R INTERVAL: 220 MS
EKG Q-T INTERVAL: 432 MS
EKG QRS DURATION: 86 MS
EKG QTC CALCULATION (BAZETT): 479 MS
EKG R AXIS: -52 DEGREES
EKG T AXIS: 98 DEGREES
EKG VENTRICULAR RATE: 74 BPM

## 2021-10-21 PROCEDURE — 93010 ELECTROCARDIOGRAM REPORT: CPT | Performed by: INTERNAL MEDICINE

## 2021-10-21 ASSESSMENT — ENCOUNTER SYMPTOMS
EYE ITCHING: 0
EYE DISCHARGE: 0
SHORTNESS OF BREATH: 0
VOMITING: 0
COUGH: 0
ABDOMINAL PAIN: 0
COLOR CHANGE: 0
CONSTIPATION: 0

## 2021-10-21 NOTE — ED NOTES
reviewed discharge instructions with pt. Follow up care and return precautions discussed. Pt verbalized understanding. Pt ambulated out of the department with steady gait.       Selena James RN  10/20/21 2379

## 2021-10-21 NOTE — ED PROVIDER NOTES
[address] [facility]      Pt Name: Elba Rey  MRN: [id number]  [place] 1970  Date of evaluation: 10/20/2021  Provider: Kayce Chin MD    CHIEF COMPLAINT       Chief Complaint   Patient presents with    Hypertension     pt wtih BP over 714 systolic tonight. pt states she ha sno history of HTN. HISTORY OF PRESENT ILLNESS    Elba Rey is a 45 y.o. female who presents to the emergency department with elevated blood pressure. Patient states that she checked her blood pressure at night and her systolic was in the 702N. Asymptomatic. Decided to come directly to the emergency room. States she has been battling a sinus infection for the last few days and took Mucinex for the first time tonight. Thinks that is the culprit. Currently has no complaints. No other associated symptoms. She does have history of high blood pressure and takes hydrochlorothiazide daily. Endorses compliance. Nursing Notes were reviewed. Including nursing noted for FM, Surgical History, Past Medical History, Social History, vitals, and allergies; agree with all. REVIEW OF SYSTEMS       Review of Systems   Constitutional: Negative for diaphoresis and unexpected weight change. HENT: Negative for congestion and dental problem. Eyes: Negative for discharge and itching. Respiratory: Negative for cough and shortness of breath. Cardiovascular: Negative for chest pain and leg swelling. Gastrointestinal: Negative for abdominal pain, constipation and vomiting. Endocrine: Negative for cold intolerance and heat intolerance. Genitourinary: Negative for vaginal bleeding, vaginal discharge and vaginal pain. Musculoskeletal: Negative for neck pain and neck stiffness. Skin: Negative for color change and pallor. Neurological: Negative for tremors and weakness. Psychiatric/Behavioral: Negative for agitation and behavioral problems. Except as noted above the remainder of the review of systems was reviewed and negative.      PAST MEDICAL HISTORY     Past Medical History:   Diagnosis Date    Anxiety     Hypertension     Insomnia     Miscarriage     x2       SURGICAL HISTORY       Past Surgical History:   Procedure Laterality Date    ECTOPIC PREGNANCY SURGERY      HYSTERECTOMY      HYSTERECTOMY         CURRENT MEDICATIONS       Discharge Medication List as of 10/20/2021 11:12 PM      CONTINUE these medications which have NOT CHANGED    Details   !! butalbital-APAP-caffeine -40 MG CAPS per capsule Take 1 capsule by mouth 3 times daily as needed for Headaches or Migraine, Disp-20 capsule, R-0Print      metoclopramide (REGLAN) 10 MG tablet Take 1 tablet by mouth 3 times daily as needed (Nausea associated with migraine headache), Disp-120 tablet, R-0Print      !! butalbital-APAP-caffeine (FIORICET) -40 MG CAPS per capsule Take 1 capsule by mouth every 4 hours as needed for Headaches, Disp-20 capsule, R-0Print      dicyclomine (BENTYL) 10 MG capsule Take 1 capsule by mouth every 6 hours as needed (cramps), Disp-20 capsule, R-0Print      ondansetron (ZOFRAN ODT) 4 MG disintegrating tablet Take 1 tablet by mouth every 8 hours as needed for Nausea, Disp-20 tablet, R-0Print      ibuprofen (ADVIL;MOTRIN) 600 MG tablet Take 1 tablet by mouth every 6 hours as needed for Pain, Disp-30 tablet, R-0Print      vilazodone HCl (VILAZODONE HCL) 10 MG TABS Take 10 mg by mouth dailyHistorical Med      amitriptyline (ELAVIL) 10 MG tablet Take 10 mg by mouth nightly as needed, R-5      atorvastatin (LIPITOR) 10 MG tablet Take 10 mg by mouth Daily, R-5      azithromycin (ZITHROMAX) 250 MG tablet Take 250 mg by mouth      busPIRone (BUSPAR) 7.5 MG tablet Take 1.5 tablets by mouth daily, R-0      fluticasone (FLONASE) 50 MCG/ACT nasal spray 1 spray by Nasal route Daily, R-0      hydrochlorothiazide (HYDRODIURIL) 25 MG tablet Take 25 mg by mouth daily, R-4      LORazepam (ATIVAN) 0.5 MG tablet Take 0.5 mg by mouth daily as needed       !! - Potential duplicate medications found. Please discuss with provider. ALLERGIES     Wellbutrin [bupropion]    FAMILY HISTORY      No family history on file. SOCIAL HISTORY       Social History     Socioeconomic History    Marital status:      Spouse name: Not on file    Number of children: Not on file    Years of education: Not on file    Highest education level: Not on file   Occupational History    Not on file   Tobacco Use    Smoking status: Current Some Day Smoker    Smokeless tobacco: Never Used    Tobacco comment: every few months    Vaping Use    Vaping Use: Never used   Substance and Sexual Activity    Alcohol use: Yes     Comment: soc    Drug use: No    Sexual activity: Not on file   Other Topics Concern    Not on file   Social History Narrative    Not on file     Social Determinants of Health     Financial Resource Strain:     Difficulty of Paying Living Expenses:    Food Insecurity:     Worried About Running Out of Food in the Last Year:     Ran Out of Food in the Last Year:    Transportation Needs:     Lack of Transportation (Medical):      Lack of Transportation (Non-Medical):    Physical Activity:     Days of Exercise per Week:     Minutes of Exercise per Session:    Stress:     Feeling of Stress :    Social Connections:     Frequency of Communication with Friends and Family:     Frequency of Social Gatherings with Friends and Family:     Attends Muslim Services:     Active Member of Clubs or Organizations:     Attends Club or Organization Meetings:     Marital Status:    Intimate Partner Violence:     Fear of Current or Ex-Partner:     Emotionally Abused:     Physically Abused:     Sexually Abused:        PHYSICAL EXAM       ED Triage Vitals [10/20/21 2245]   BP Temp Temp Source Pulse Resp SpO2 Height Weight   126/80 97.8 °F (36.6 °C) Tympanic 87 17 97 % 5' 3\" (1.6 m) 178 lb 12.7 oz (81.1 kg)       Physical Exam  Vitals and nursing note reviewed. Constitutional:       General: She is not in acute distress. Appearance: She is well-developed. She is not ill-appearing, toxic-appearing or diaphoretic. HENT:      Head: Normocephalic and atraumatic. Right Ear: External ear normal.      Left Ear: External ear normal.   Eyes:      General:         Right eye: No discharge. Left eye: No discharge. Conjunctiva/sclera: Conjunctivae normal.      Pupils: Pupils are equal, round, and reactive to light. Cardiovascular:      Rate and Rhythm: Normal rate and regular rhythm. Heart sounds: No murmur heard. Pulmonary:      Effort: Pulmonary effort is normal. No respiratory distress. Breath sounds: Normal breath sounds. No wheezing or rales. Abdominal:      General: Bowel sounds are normal. There is no distension. Palpations: Abdomen is soft. There is no mass. Tenderness: There is no abdominal tenderness. There is no guarding or rebound. Genitourinary:     Comments: Deferred  Musculoskeletal:         General: No deformity. Normal range of motion. Cervical back: Normal range of motion and neck supple. Skin:     General: Skin is warm. Findings: No erythema or rash. Neurological:      Mental Status: She is alert and oriented to person, place, and time. She is not disoriented. Cranial Nerves: No cranial nerve deficit. Motor: No atrophy or abnormal muscle tone. Coordination: Coordination normal.   Psychiatric:         Behavior: Behavior normal.         Thought Content:  Thought content normal.         DIAGNOSTIC RESULTS     EKG: All EKG's are interpreted by the Emergency Department Physician who either signs or Co-signs this chart in the absence of acardiologist.    EKG normal sinus rhythm sinus arrhythmia nonspecific EKG changes    RADIOLOGY:   Non-plain film images such as CT, Ultrasoundand MRI are read by the radiologist. Farida Genao radiographic images are visualized and preliminarily interpreted by the emergency physician with the below findings:    None    ED BEDSIDE ULTRASOUND:   Performed by ED Physician - none    LABS:  Labs Reviewed - No data to display    All other labs were withinnormal range or not returned as of this dictation. EMERGENCY DEPARTMENT COURSE and DIFFERENTIAL DIAGNOSIS/MDM:     PMH, Surgical Hx, FH, Social Hx reviewed by myself (ETOH usage, Tobacco usage, Drug usage reviewed by myself, no pertinent Hx)- No Pertinent Hx     Old records were reviewed by me     Asymptomatic with a normal blood pressure here. Discussed close PCP follow-up outpatient. No change to patient's medications. To get a new blood pressure cuff. Patient verbalized understanding. I estimate there is LOW risk for Sepsis, MI, Stroke, Tamponade, PTX, Toxicity or other life threatening etiology thus I consider the discharge disposition reasonable. The patient is at low risk for mortality based on demographic, history and clinical factors. Given the best available information and clinical assessment, I estimate the risk of hospitalization to be greater than risk of treatment at home. I have explained to the patient that the risk could rapidly change, given precautions for return and instructions. Explained to patient that the risk for mortality is low based on demographic, history and clinical factors. I discussed with patient the results of evaluation in the ED, diagnosis, care, and prognosis. The plan is to discharge to home. Patient is in agreement with plan and questions have been answered. I also discussed with patient the reasons which may require a return visit and the importance of follow-up care. The patient is well-appearing, nontoxic, and improved at the time of discharge. Patient agrees to call to arrange follow-up care as directed.    Patient understands to return immediately for worsening/change in symptoms. CRITICAL CARE TIME   Total Critical Caretime was 0 minutes, excluding separately reportable procedures. There was a high probability of clinically significant/life threatening deterioration in the patient's condition which required my urgent intervention. PROCEDURES:  Unlessotherwise noted below, none    FINAL IMPRESSION      1.  Primary hypertension          DISPOSITION/PLAN   DISPOSITION Decision To Discharge 10/20/2021 10:58:21 PM    PATIENT REFERRED TO:  Sandrajuan luis Mancuso, [address] [facility]  [phone]            DISCHARGE MEDICATIONS:  Discharge Medication List as of 10/20/2021 11:12 PM             (Please note that portions ofthis note were completed with a voice recognition program.  Efforts were made to edit the dictations but occasionally words are mis-transcribed.)    Tabatha Ruiz MD(electronically signed)  Attending Emergency Physician        Tabatha Ruiz MD  10/21/21 1930